# Patient Record
Sex: MALE | Race: WHITE | NOT HISPANIC OR LATINO | Employment: OTHER | URBAN - NONMETROPOLITAN AREA
[De-identification: names, ages, dates, MRNs, and addresses within clinical notes are randomized per-mention and may not be internally consistent; named-entity substitution may affect disease eponyms.]

---

## 2018-02-28 ENCOUNTER — OUTSIDE FACILITY SERVICE (OUTPATIENT)
Dept: CARDIOLOGY | Facility: CLINIC | Age: 68
End: 2018-02-28

## 2018-02-28 PROCEDURE — 93306 TTE W/DOPPLER COMPLETE: CPT | Performed by: INTERNAL MEDICINE

## 2021-01-14 ENCOUNTER — OFFICE VISIT (OUTPATIENT)
Age: 71
End: 2021-01-14

## 2021-01-14 VITALS — TEMPERATURE: 98 F | HEART RATE: 58 BPM | OXYGEN SATURATION: 98 %

## 2021-01-14 DIAGNOSIS — Z11.59 SCREENING FOR VIRAL DISEASE: Primary | ICD-10-CM

## 2021-01-14 PROCEDURE — 99999 PR OFFICE/OUTPT VISIT,PROCEDURE ONLY: CPT | Performed by: NURSE PRACTITIONER

## 2021-01-16 LAB — SARS-COV-2, NAA: NOT DETECTED

## 2021-01-27 ENCOUNTER — OFFICE VISIT (OUTPATIENT)
Age: 71
End: 2021-01-27

## 2021-01-27 VITALS — TEMPERATURE: 96.6 F | HEART RATE: 63 BPM | OXYGEN SATURATION: 97 %

## 2021-01-27 DIAGNOSIS — Z11.59 SCREENING FOR VIRAL DISEASE: Primary | ICD-10-CM

## 2021-01-27 PROCEDURE — 99999 PR OFFICE/OUTPT VISIT,PROCEDURE ONLY: CPT | Performed by: NURSE PRACTITIONER

## 2021-01-28 LAB — SARS-COV-2, NAA: NOT DETECTED

## 2022-01-27 ENCOUNTER — TELEPHONE (OUTPATIENT)
Dept: CARDIAC REHAB | Age: 72
End: 2022-01-27

## 2022-01-27 NOTE — TELEPHONE ENCOUNTER
Cardiac Rehab referral received from Dr Eve Alegria at [facility] in [place]. Phone contact was made with patient to arrange a CR \"orientation & assessment\" appointment yet the patient denies the need. Although offered, patient did not take down contact information for [facility] at this time.

## 2023-10-24 ENCOUNTER — HOSPITAL ENCOUNTER (OUTPATIENT)
Dept: GENERAL RADIOLOGY | Age: 73
Discharge: HOME | End: 2023-10-24
Payer: MEDICARE

## 2023-10-24 DIAGNOSIS — N18.31 STAGE 3A CHRONIC KIDNEY DISEASE (HCC): ICD-10-CM

## 2023-10-24 PROCEDURE — 76770 US EXAM ABDO BACK WALL COMP: CPT
